# Patient Record
Sex: FEMALE | Race: WHITE | NOT HISPANIC OR LATINO | URBAN - METROPOLITAN AREA
[De-identification: names, ages, dates, MRNs, and addresses within clinical notes are randomized per-mention and may not be internally consistent; named-entity substitution may affect disease eponyms.]

---

## 2017-01-20 ENCOUNTER — FOLLOW UP ESTABLISHED (OUTPATIENT)
Dept: URBAN - METROPOLITAN AREA CLINIC 24 | Facility: CLINIC | Age: 82
End: 2017-01-20
Payer: COMMERCIAL

## 2017-01-20 DIAGNOSIS — H31.013 MACULA SCARS OF POSTERIOR POLE (POST-TRAUMATIC), BILATERAL: Primary | ICD-10-CM

## 2017-01-20 DIAGNOSIS — H02.822 CYSTS OF RIGHT LOWER EYELID: ICD-10-CM

## 2017-01-20 DIAGNOSIS — B39.9 HISTOPLASMOSIS, UNSPECIFIED: ICD-10-CM

## 2017-01-20 PROCEDURE — 92134 CPTRZ OPH DX IMG PST SGM RTA: CPT | Performed by: OPTOMETRIST

## 2017-01-20 PROCEDURE — 92014 COMPRE OPH EXAM EST PT 1/>: CPT | Performed by: OPTOMETRIST

## 2017-01-20 ASSESSMENT — VISUAL ACUITY
OD: 20/150
OS: 20/200

## 2017-01-20 ASSESSMENT — INTRAOCULAR PRESSURE
OS: 15
OD: 12

## 2018-02-02 ENCOUNTER — FOLLOW UP ESTABLISHED (OUTPATIENT)
Dept: URBAN - METROPOLITAN AREA CLINIC 24 | Facility: CLINIC | Age: 83
End: 2018-02-02
Payer: MEDICARE

## 2018-02-02 DIAGNOSIS — H16.143 PUNCTATE KERATITIS, BILATERAL: ICD-10-CM

## 2018-02-02 PROCEDURE — 92014 COMPRE OPH EXAM EST PT 1/>: CPT | Performed by: OPTOMETRIST

## 2018-02-02 PROCEDURE — 92134 CPTRZ OPH DX IMG PST SGM RTA: CPT | Performed by: OPTOMETRIST

## 2018-02-02 ASSESSMENT — INTRAOCULAR PRESSURE
OD: 13
OS: 15

## 2018-02-02 ASSESSMENT — VISUAL ACUITY
OD: 20/125
OS: 20/150

## 2019-04-22 ENCOUNTER — FOLLOW UP ESTABLISHED (OUTPATIENT)
Dept: URBAN - METROPOLITAN AREA CLINIC 26 | Facility: CLINIC | Age: 84
End: 2019-04-22
Payer: MEDICARE

## 2019-04-22 DIAGNOSIS — Z96.1 PRESENCE OF INTRAOCULAR LENS: ICD-10-CM

## 2019-04-22 PROCEDURE — 92014 COMPRE OPH EXAM EST PT 1/>: CPT | Performed by: OPTOMETRIST

## 2019-04-22 PROCEDURE — 92134 CPTRZ OPH DX IMG PST SGM RTA: CPT | Performed by: OPTOMETRIST

## 2019-04-22 ASSESSMENT — VISUAL ACUITY
OS: 20/125
OD: 20/125

## 2019-04-22 ASSESSMENT — INTRAOCULAR PRESSURE
OS: 16
OD: 17

## 2022-03-07 ENCOUNTER — OFFICE VISIT (OUTPATIENT)
Dept: URBAN - METROPOLITAN AREA CLINIC 24 | Facility: CLINIC | Age: 87
End: 2022-03-07
Payer: MEDICARE

## 2022-03-07 DIAGNOSIS — H40.013 OPEN ANGLE WITH BORDERLINE FINDINGS, LOW RISK, BILATERAL: ICD-10-CM

## 2022-03-07 DIAGNOSIS — H26.493 OTHER SECONDARY CATARACT, BILATERAL: Primary | ICD-10-CM

## 2022-03-07 PROCEDURE — 92134 CPTRZ OPH DX IMG PST SGM RTA: CPT | Performed by: OPTOMETRIST

## 2022-03-07 PROCEDURE — 92250 FUNDUS PHOTOGRAPHY W/I&R: CPT | Performed by: OPTOMETRIST

## 2022-03-07 PROCEDURE — 99214 OFFICE O/P EST MOD 30 MIN: CPT | Performed by: OPTOMETRIST

## 2022-03-07 ASSESSMENT — KERATOMETRY
OS: 45.94
OD: 46.79

## 2022-03-07 ASSESSMENT — INTRAOCULAR PRESSURE
OD: 15
OS: 16

## 2022-03-07 ASSESSMENT — VISUAL ACUITY
OD: 20/150
OS: 20/200

## 2022-03-07 NOTE — IMPRESSION/PLAN
Impression: Open angle with borderline findings, low risk, bilateral: H40.013. Plan: Pale (cupped) disc -- POHS scarring likely contributes, however coherent pt noticing increased peripheral vision loss. Good IOP. Recommend baseline glc consult considering ocular co-morbities

## 2022-03-07 NOTE — IMPRESSION/PLAN
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 --Pt grew up in [place]. Plan: DRY scar is limiting factor. Updated photodocumentation ou No further action. Stable.

## 2022-03-07 NOTE — IMPRESSION/PLAN
Impression: Other secondary cataract, bilateral: H26.493. Plan: Opacified capsule with option for Yag laser capsulotomy. Discussed procedure, risks, benefits and side effects. Patient request Yag laser capsulotomy OD ONLY. 
-- pt aware vision highly limited by maculas

## 2022-03-29 ENCOUNTER — SURGERY (OUTPATIENT)
Dept: URBAN - METROPOLITAN AREA SURGERY 12 | Facility: SURGERY | Age: 87
End: 2022-03-29
Payer: MEDICARE

## 2022-03-29 PROCEDURE — 66821 AFTER CATARACT LASER SURGERY: CPT | Performed by: OPHTHALMOLOGY

## 2022-03-31 ENCOUNTER — OFFICE VISIT (OUTPATIENT)
Dept: URBAN - METROPOLITAN AREA CLINIC 24 | Facility: CLINIC | Age: 87
End: 2022-03-31
Payer: MEDICARE

## 2022-03-31 DIAGNOSIS — H40.1133 BILATERAL PRIMARY OPEN-ANGLE GLAUCOMA, SEVERE STAGE: Primary | ICD-10-CM

## 2022-03-31 PROCEDURE — 99214 OFFICE O/P EST MOD 30 MIN: CPT | Performed by: OPHTHALMOLOGY

## 2022-03-31 PROCEDURE — 92020 GONIOSCOPY: CPT | Performed by: OPHTHALMOLOGY

## 2022-03-31 ASSESSMENT — INTRAOCULAR PRESSURE
OD: 13
OS: 14

## 2022-03-31 NOTE — IMPRESSION/PLAN
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 --Pt grew up in [place] on [place] farm. Plan: DRY scar is limiting factor. No further action. Stable.

## 2022-03-31 NOTE — IMPRESSION/PLAN
Impression: Bilateral primary open-angle glaucoma, severe stage: G64.6777. Plan: Pt has Glaucoma    Gonio :  SS 2+/SS 1+     Pachs:      Today's IOP :  13/14       Tmax  :  32/21 Target IOP low teens Pt denies Fhx of Glaucoma Left eye is the better seeing eye HVF Dense loss with small island remaining 3/31/22 C/D:  0.6x0.6/0.7x0.7 OCT: 50/54 3/31/22 Pt denies Sulfa Allergy   // Pt denies Lung /Heart dx Plan :
1. Continue to monitor without medication, No treatment recommended at this time. 2. Given exam today, pt has significant vision loss 2/2 macular disease; her Rehan Mendez 74 looks pale with cupping as well. Given her IOP today, would continue to monitor at this time.   
2.Patient to continue care with Dr. Vita Boyer 3 months  (Glaucoma PRN )

## 2022-05-24 ENCOUNTER — OFFICE VISIT (OUTPATIENT)
Dept: URBAN - METROPOLITAN AREA CLINIC 24 | Facility: CLINIC | Age: 87
End: 2022-05-24
Payer: MEDICARE

## 2022-05-24 DIAGNOSIS — H31.013 MACULA SCARS OF POSTERIOR POLE (POSTINFLAMMATORY) (POST-TRAUMATIC), BILATERAL: ICD-10-CM

## 2022-05-24 DIAGNOSIS — H26.492 OTHER SECONDARY CATARACT, LEFT EYE: ICD-10-CM

## 2022-05-24 DIAGNOSIS — H40.013 OPEN ANGLE WITH BORDERLINE FINDINGS, LOW RISK, BILATERAL: Primary | ICD-10-CM

## 2022-05-24 PROCEDURE — 99213 OFFICE O/P EST LOW 20 MIN: CPT | Performed by: OPTOMETRIST

## 2022-05-24 ASSESSMENT — INTRAOCULAR PRESSURE
OS: 14
OD: 16

## 2022-05-24 ASSESSMENT — VISUAL ACUITY
OS: 20/100
OD: 20/80

## 2022-05-24 NOTE — IMPRESSION/PLAN
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 --Pt grew up in [place] farm; [address]: DRY scar is limiting factor. No further action. 
Will refer patient to [facility] for low vision needs

## 2022-05-24 NOTE — IMPRESSION/PLAN
Impression: Open angle with borderline findings, low risk, bilateral: H40.013. Plan: PT SEEN IN GLOBAL POST-OP PERIOD FOLLOWING YAG OD FOR UNRELATED ISSUE (IOP check per glc MD)
s/p Glc Consult Dr Shirlene Pallas 3/31/22 Will continue to monitor suspicion w/o pharmacologic tx. Stressed importance of continued observation. Pt advised future tx may be indicated.

## 2022-05-24 NOTE — IMPRESSION/PLAN
Impression: Other secondary cataract, left eye: H26.492.
-- s/p Yag OD Plan: Minimal improvement noted OD following Yag. Not indicated OS.